# Patient Record
Sex: FEMALE | Race: BLACK OR AFRICAN AMERICAN | Employment: FULL TIME | URBAN - METROPOLITAN AREA
[De-identification: names, ages, dates, MRNs, and addresses within clinical notes are randomized per-mention and may not be internally consistent; named-entity substitution may affect disease eponyms.]

---

## 2018-11-28 ENCOUNTER — DOCUMENTATION ONLY (OUTPATIENT)
Dept: SURGERY | Age: 53
End: 2018-11-28

## 2018-11-28 NOTE — LETTER
[facility] [place] [facility] 
 
 
Dear Patient, Your health is our main concern. It is important for your health to have follow-up lab work and to see you surgeon at 2 months, 4 months, 6 months, 9 months and annually after your weight loss surgery. Additionally, the Department of Bariatric Surgery at our hospital is a member of the [employer] [address] Surgical Quality Improvement Program ([facility] NSQIP). As a participant in this program, we gather information on the outcomes of our patients after surgery. Please call the office for a follow up appointment at [phone]. If you have moved out of the area or have changed surgeons please call us and let us know the name of your doctor. Your health and feedback are important to us. We greatly appreciate your response. Thank you, [facility] [address]

## 2018-12-05 ENCOUNTER — TELEPHONE (OUTPATIENT)
Dept: SURGERY | Age: 53
End: 2018-12-05

## 2019-01-08 ENCOUNTER — OFFICE VISIT (OUTPATIENT)
Dept: SURGERY | Age: 54
End: 2019-01-08

## 2019-01-08 ENCOUNTER — HOSPITAL ENCOUNTER (OUTPATIENT)
Dept: LAB | Age: 54
Discharge: HOME | End: 2019-01-08
Payer: COMMERCIAL

## 2019-01-08 VITALS
OXYGEN SATURATION: 99 % | WEIGHT: 205.3 LBS | TEMPERATURE: 98.7 F | SYSTOLIC BLOOD PRESSURE: 130 MMHG | DIASTOLIC BLOOD PRESSURE: 82 MMHG | BODY MASS INDEX: 41.39 KG/M2 | HEART RATE: 73 BPM | HEIGHT: 59 IN

## 2019-01-08 DIAGNOSIS — Z98.84 S/P BARIATRIC SURGERY: ICD-10-CM

## 2019-01-08 DIAGNOSIS — E55.9 HYPOVITAMINOSIS D: ICD-10-CM

## 2019-01-08 DIAGNOSIS — K90.9 INTESTINAL MALABSORPTION, UNSPECIFIED TYPE: Primary | ICD-10-CM

## 2019-01-08 PROBLEM — E66.01 OBESITY, MORBID (HCC): Status: ACTIVE | Noted: 2019-01-08

## 2019-01-08 PROBLEM — K21.9 GERD (GASTROESOPHAGEAL REFLUX DISEASE): Status: ACTIVE | Noted: 2019-01-08

## 2019-01-08 LAB — 25(OH)D3 SERPL-MCNC: 12.1 NG/ML (ref 30–100)

## 2019-01-08 PROCEDURE — 36415 COLL VENOUS BLD VENIPUNCTURE: CPT

## 2019-01-08 PROCEDURE — 82306 VITAMIN D 25 HYDROXY: CPT

## 2019-01-08 NOTE — PROGRESS NOTES
Subjective:  
  
Angelia rTan is a 48 y.o. female is now 5 years status post laparoscopic sleeve gastrectomy. Doing well overall. She has lost a total of 22 pounds since surgery. Body mass index is 41.47 kg/m². Has lost 20% of EBW. Currently on a solid food diet without difficulty, reports no issues and denies vomiting and abdominal pain. Taking in 30oz water daily. Sources of protein include chicken. She states that bread is her weakness along with fried foods such as french fries and fried chicken. 45 min of activity 3 days a week, including walk, run and going to the gym. She is sleeping 6-7 hours a night on average. Bowel movements are regular. The patient is not having any pain. . The patient is compliant with calcium supplements. Her PCP told her to stop taking her multivitamin, vit B12 and Rx vit D. Weight Loss Metrics 1/8/2019 7/16/2015 1/13/2015 10/2/2014 7/3/2014 4/22/2014 2/20/2014 Today's Wt 205 lb 4.8 oz 173 lb 168 lb 171 lb 171 lb 181 lb 192 lb BMI 41.47 kg/m2 34.92 kg/m2 33.91 kg/m2 34.52 kg/m2 34.52 kg/m2 36.54 kg/m2 38.76 kg/m2 Patient Active Problem List  
Diagnosis Code  Menopause Z78.0  Hypertension I10  
 Intestinal malabsorption K90.9  Unspecified intestinal malabsorption K90.9  Status post bariatric surgery Z98.84  
 Hypovitaminosis D E55.9  Obesity, morbid ([address] E66.01  
 GERD (gastroesophageal reflux disease) K21.9 Past Medical History:  
Diagnosis Date  Hypertension  Hypovitaminosis D  Menopause  Morbid obesity ([address] 4/18/2013  Other ill-defined conditions(799.89) chronic lymph edema  Status post bariatric surgery  Unspecified intestinal malabsorption Past Surgical History:  
Procedure Laterality Date  HX BREAST BIOPSY    
 left  HX LAP CHOLECYSTECTOMY  HX TUBAL LIGATION    
 DEVIN BIOPSY BREAST STEREOTACTIC  2010  
 left (benign)  MO LAP, BLAIRE RESTRICT PROC, LONGITUDINAL GASTRECTOMY Current Outpatient Medications Medication Sig Dispense Refill  hydrochlorothiazide (HYDRODIURIL) 25 mg tablet Take 25 mg by mouth daily.  calcium citrate-vitamin D3 500 mg-500 unit /5 gram pwpk Take  by mouth. No Known Allergies Review of Systems: 
General - No history or complaints of unexpected fever or chills Head/Neck - No history or complaints of headache or dizziness Cardiac - No history or complaints of chest pain, palpitations, or shortness of breath Pulmonary - No history or complaints of shortness of breath or productive cough Gastrointestinal - as noted above Genitourinary - No history or complaints of hematuria/dysuria or renal lithiasis Musculoskeletal - No history or complaints of joint  muscular weakness Hematologic - No history of any bleeding episodes Neurologic - No history or complaints of  migraine headaches or neurologic symptoms Objective:  
 
Visit Vitals /82 (BP 1 Location: Left arm, BP Patient Position: Sitting) Pulse 73 Temp 98.7 °F (37.1 °C) Ht 4' 11\" (1.499 m) Wt 93.1 kg (205 lb 4.8 oz) SpO2 99% BMI 41.47 kg/m² General:  alert, cooperative, no distress, appears stated age Chest: lungs clear to auscultation, breath sounds equal and symmetric, no rhonchi, rales or wheezes, no accessory muscle use Cor:   Regular rate and rhythm or without murmur or extra heart sounds Abdomen: soft, bowel sounds active, non-tender, no masses or organomegaly Incisions:   healed well Assessment:  
History of Morbid obesity, status post laparoscopic sleeve gastrectomy. Doing well postoperatively. Increase fluid intake to >64oz daily. I suspect that she is drinking more than she calculated because she is also taking 25mg of HCTZ and is not exhibiting signs of dehydration.  
Weight regain - strict diet control, pt ed given on the effects of carbohydrate consumption and weight control. Recommend that she keep food log and have RD review it. Lab slip given to assess Vit D level Pt is to resume taking multivitamin and Vit B12. Since her Vit B12 level was so high, I told her she can take Vit b12 every other day if she wants. GERD - continue PPI 
HTN - f/u PCP Pt education given on the effects of sleep deprivation and weight control. Goal is 7-9 hours of sleep each night. Plan:  
 
1. Increase activity to the goal of 30 minutes daily 2. Discussed patients weight loss goals and dietary choices in relation to goals. 3. Reminded to measure portions, continue high protein, low carbohydrate diet. Reminded to eat regularly, to eat slowly & not to drink with meals. 4. Continue vitamin supplementation 5. Continue current medications and follow up with PCP for management of regimen. 6. Continue cardio exercise and add resistance exercises. 60-90 minutes of aerobic activity 5 days a week and strength training 2 days each week. 7. Encouraged to attend support group 8. Patient to complete labs before next visit. Lab slip given today. 9. I have discussed this plan with patient and they verbalized understanding 10. Follow up in 6 months or sooner if patient has questions, concerns or worsening of condition, if unable to reach our office, patient should report to the ED. 6. Ms. Rosemary Valdes has a reminder for a \"due or due soon\" health maintenance. I have asked that she contact her primary care provider for a follow-up on this health maintenance.

## 2019-01-08 NOTE — PATIENT INSTRUCTIONS
Patient Instructions 1. Remember hydration goals - minimum of 64 ounces of liquids per day (dehydration is the number one reason for hospital readmission). 2. Continue to monitor carbohydrate and protein intake you need a minimum of  Grams of protein daily- remember to keep your total carbohydrates to 50 grams or less per day for best results. 3. Continue to work towards exercise goals - 60-90 minutes, 5 times a week minimum of deliberate, aerobic exercise is the ultimate goal with strength training 2 times each week. Refer to [employer] for  information. 4. Remember to take vitamins as directed in your handbook. 5. Attend support group the 2nd Thursday of each month. 6. Constipation: Milk of Magnesia is for immediate relief. Miralax is to be used every day if constipation is a chronic problem. 7. Diarrhea: patients will occasionally develop lactose intolerance after surgery. Check to see if your protein shake has whey in it. If it does try one that does not have whey and stop all yogurts, cheeses and milks to see if the diarrhea goes away. 8. Call us at [phone] or email us through [facility]" with questions,     concerns or worsening of condition, we have someone on call 24 hours a day. If you are unable to reach our office, you are to go to your Primary Care Physician or the Emergency Department. Supplement Resource Guide Importance of Protein:  
Maintains lean body mass, produces antibodies to fight off infections, heals wounds, minimizes hair loss, helps to give you energy, helps with satiety, and keeping you full between meals. Importance of Calcium: 
Needed for healthy bones and teeth, normal blood clotting, and nervous system functioning, higher risk of osteoporosis and bone disease with non-compliance. Importance of Multivitamins: Many functions. Supply you with extra nutrients that you may be missing from food. May lead to iron deficiency anemia, weakness, fatigue, and many other symptoms with non-compliance. Importance of B Vitamins: 
Important for red blood cell formation, metabolism, energy, and helps to maintain a healthy nervous system. Protein Supplement Find one you like now. Use immediately after surgery. Look for: 
35-50g protein each day from your protein supplement once you reach the progression diet. 0-3 g fat per serving 0-3 g sugar per serving Protein drinks should be split in separate dosages. Recommend: Lifelong 1 year +Calcium Supplement:  
 
Start taking within a month after surgery. Look for: Calcium Citrate Plus D (1500 mg per day) Recommend: Citracal 
 
 . Avoid chocolate chewable calcium. Can use chewable bariatric or [employer] brand or similar chewable. The body cannot absorb more than 500-600 mg of calcium at a time. Take for Life Multi-vitamin Supplement:   
Start immediately after surgery: any complete chewable, such as: [place]s Complete chewables. Avoid [place] sours or gummies. They lack iron and other important nutrients and also have added sugar. Continue with chewable vitamin or change to adult complete multivitamin one month after surgery. Menstruating women can take a prenatal vitamin. Make sure has at least 18 mg iron and 627-230 mcg folic acid Vitamin B12, B Complex Vitamin, and Biotin Start taking within a month after surgery. Vitamin B12:  1000 mcg of Vitamin B12 three times weekly Must take sublingually (meaning you take it under your tongue) or in a liquid drop form for easy absorption. B Complex Vitamin: Take a pill or liquid drop form once daily. Biotin: This vitamin can help prevent hair loss. Recommend 5mg  
(5000 mcg) a day Biotin is Optional

## 2019-01-09 ENCOUNTER — TELEPHONE (OUTPATIENT)
Dept: SURGERY | Age: 54
End: 2019-01-09

## 2019-01-09 NOTE — TELEPHONE ENCOUNTER
I left a message for patient on her cell phone and home phone voice mail. I asked if she would call me back. Her Vit D lab was low at 12.6 which is significantly low. She already has Rx Vit D pills at home. I wanted to find out how many pills she already has and send in a new prescription for her and I was going to increase her dose to two times a week.

## 2019-01-10 ENCOUNTER — TELEPHONE (OUTPATIENT)
Dept: SURGERY | Age: 54
End: 2019-01-10

## 2019-01-11 ENCOUNTER — TELEPHONE (OUTPATIENT)
Dept: SURGERY | Age: 54
End: 2019-01-11

## 2019-01-11 DIAGNOSIS — E55.9 HYPOVITAMINOSIS D: Primary | ICD-10-CM

## 2019-01-11 RX ORDER — ERGOCALCIFEROL 1.25 MG/1
50000 CAPSULE ORAL 2 TIMES WEEKLY
Qty: 52 CAP | Refills: 1 | Status: SHIPPED | OUTPATIENT
Start: 2019-01-11 | End: 2019-07-09

## 2019-01-11 NOTE — TELEPHONE ENCOUNTER
I called patient to let her know that her Vit D level is low at 12.6. I sent Rx Vit D to her pharmacy. Her PCP told her to stop taking her multivitamin, Vit B12 and Vit D. I told her that she needs to take the multivitamin, B12 and Rx D. I told her that if she wants to take Vit B12 every other day she can because her level was over 2,000. Patient verbalized understanding. Patient is to contact our office with any problems, worsening of condition, questions or concerns. If we are not available or unable to be reached, patient is to proceed to the Emergency Department.

## 2020-11-13 ENCOUNTER — DOCUMENTATION ONLY (OUTPATIENT)
Dept: SURGERY | Age: 55
End: 2020-11-13

## 2020-11-13 NOTE — PROGRESS NOTES
Per [facility] requirements;  E-mail and letter sent for follow up appointment. [employer] [facility]  [address]   [address], [address] [facility]                 [employer] [facility] [place]  [facility]  [facility]      Dear Patient,    Your health is our main concern. It is important for your health to have follow-up lab work and to see your surgeon at 2 months, 4 months, 6 months, 9 months and annually after your weight loss surgery. Additionally, the Department of Bariatric Surgery at our hospital is a member of the [employer] of [address] [place] [address] Surgical Quality Improvement Program ([facility] NSQIP). As a participant in this program, we gather information on the outcomes of our patients after surgery. Please call the office for a follow up appointment at [phone]. If you have moved out of the area or have changed surgeons please call us and let us know the name of your doctor. Your health and feedback are important to us. We greatly appreciate your response.        Thank you,  [facility] [place] [facility] [address] [facility]

## 2022-03-19 PROBLEM — E66.01 OBESITY, MORBID (HCC): Status: ACTIVE | Noted: 2019-01-08

## 2022-03-19 PROBLEM — K21.9 GERD (GASTROESOPHAGEAL REFLUX DISEASE): Status: ACTIVE | Noted: 2019-01-08

## 2022-09-20 ENCOUNTER — OFFICE VISIT (OUTPATIENT)
Dept: SURGERY | Age: 57
End: 2022-09-20
Payer: COMMERCIAL

## 2022-09-20 ENCOUNTER — HOSPITAL ENCOUNTER (OUTPATIENT)
Dept: LAB | Age: 57
Discharge: HOME | End: 2022-09-20
Payer: COMMERCIAL

## 2022-09-20 VITALS
TEMPERATURE: 97.3 F | HEIGHT: 59 IN | DIASTOLIC BLOOD PRESSURE: 62 MMHG | OXYGEN SATURATION: 100 % | HEART RATE: 76 BPM | SYSTOLIC BLOOD PRESSURE: 122 MMHG | WEIGHT: 201.3 LBS | BODY MASS INDEX: 40.58 KG/M2

## 2022-09-20 DIAGNOSIS — K90.9 INTESTINAL MALABSORPTION, UNSPECIFIED TYPE: Primary | ICD-10-CM

## 2022-09-20 DIAGNOSIS — Z98.84 STATUS POST BARIATRIC SURGERY: ICD-10-CM

## 2022-09-20 DIAGNOSIS — E55.9 HYPOVITAMINOSIS D: ICD-10-CM

## 2022-09-20 DIAGNOSIS — L30.4 INTERTRIGINOUS DERMATITIS ASSOCIATED WITH MOISTURE: ICD-10-CM

## 2022-09-20 DIAGNOSIS — K90.9 INTESTINAL MALABSORPTION, UNSPECIFIED TYPE: ICD-10-CM

## 2022-09-20 LAB
25(OH)D3 SERPL-MCNC: 30.2 NG/ML (ref 30–100)
ALBUMIN SERPL-MCNC: 3.5 G/DL (ref 3.4–5)
ALBUMIN/GLOB SERPL: 1 {RATIO} (ref 0.8–1.7)
ALP SERPL-CCNC: 110 U/L (ref 45–117)
ALT SERPL-CCNC: 16 U/L (ref 13–56)
ANION GAP SERPL CALC-SCNC: 2 MMOL/L (ref 3–18)
AST SERPL-CCNC: 14 U/L (ref 10–38)
BASOPHILS # BLD: 0.1 K/UL (ref 0–0.1)
BASOPHILS NFR BLD: 1 % (ref 0–2)
BILIRUB SERPL-MCNC: 0.2 MG/DL (ref 0.2–1)
BUN SERPL-MCNC: 16 MG/DL (ref 7–18)
BUN/CREAT SERPL: 19 (ref 12–20)
CALCIUM SERPL-MCNC: 9.2 MG/DL (ref 8.5–10.1)
CHLORIDE SERPL-SCNC: 104 MMOL/L (ref 100–111)
CO2 SERPL-SCNC: 36 MMOL/L (ref 21–32)
CREAT SERPL-MCNC: 0.85 MG/DL (ref 0.6–1.3)
DIFFERENTIAL METHOD BLD: NORMAL
EOSINOPHIL # BLD: 0.2 K/UL (ref 0–0.4)
EOSINOPHIL NFR BLD: 3 % (ref 0–5)
ERYTHROCYTE [DISTWIDTH] IN BLOOD BY AUTOMATED COUNT: 12.8 % (ref 11.6–14.5)
FERRITIN SERPL-MCNC: 37 NG/ML (ref 8–388)
FOLATE SERPL-MCNC: 9.1 NG/ML (ref 3.1–17.5)
GLOBULIN SER CALC-MCNC: 3.4 G/DL (ref 2–4)
GLUCOSE SERPL-MCNC: 83 MG/DL (ref 74–99)
HCT VFR BLD AUTO: 38.7 % (ref 35–45)
HGB BLD-MCNC: 12.5 G/DL (ref 12–16)
IMM GRANULOCYTES # BLD AUTO: 0 K/UL (ref 0–0.04)
IMM GRANULOCYTES NFR BLD AUTO: 0 % (ref 0–0.5)
IRON SERPL-MCNC: 33 UG/DL (ref 50–175)
LYMPHOCYTES # BLD: 2.4 K/UL (ref 0.9–3.6)
LYMPHOCYTES NFR BLD: 33 % (ref 21–52)
MCH RBC QN AUTO: 25.6 PG (ref 24–34)
MCHC RBC AUTO-ENTMCNC: 32.3 G/DL (ref 31–37)
MCV RBC AUTO: 79.1 FL (ref 78–100)
MONOCYTES # BLD: 0.5 K/UL (ref 0.05–1.2)
MONOCYTES NFR BLD: 7 % (ref 3–10)
NEUTS SEG # BLD: 4.1 K/UL (ref 1.8–8)
NEUTS SEG NFR BLD: 56 % (ref 40–73)
NRBC # BLD: 0 K/UL (ref 0–0.01)
NRBC BLD-RTO: 0 PER 100 WBC
PLATELET # BLD AUTO: 362 K/UL (ref 135–420)
PMV BLD AUTO: 9.7 FL (ref 9.2–11.8)
POTASSIUM SERPL-SCNC: 2.9 MMOL/L (ref 3.5–5.5)
PROT SERPL-MCNC: 6.9 G/DL (ref 6.4–8.2)
RBC # BLD AUTO: 4.89 M/UL (ref 4.2–5.3)
SODIUM SERPL-SCNC: 142 MMOL/L (ref 136–145)
VIT B12 SERPL-MCNC: >2000 PG/ML (ref 211–911)
WBC # BLD AUTO: 7.3 K/UL (ref 4.6–13.2)

## 2022-09-20 PROCEDURE — 80053 COMPREHEN METABOLIC PANEL: CPT

## 2022-09-20 PROCEDURE — 99204 OFFICE O/P NEW MOD 45 MIN: CPT | Performed by: NURSE PRACTITIONER

## 2022-09-20 PROCEDURE — 82607 VITAMIN B-12: CPT

## 2022-09-20 PROCEDURE — 82728 ASSAY OF FERRITIN: CPT

## 2022-09-20 PROCEDURE — 82306 VITAMIN D 25 HYDROXY: CPT

## 2022-09-20 PROCEDURE — 85025 COMPLETE CBC W/AUTO DIFF WBC: CPT

## 2022-09-20 PROCEDURE — 84425 ASSAY OF VITAMIN B-1: CPT

## 2022-09-20 PROCEDURE — 36415 COLL VENOUS BLD VENIPUNCTURE: CPT

## 2022-09-20 PROCEDURE — 83540 ASSAY OF IRON: CPT

## 2022-09-20 RX ORDER — B-COMPLEX WITH VITAMIN C
1 TABLET ORAL DAILY
COMMUNITY

## 2022-09-20 RX ORDER — NYSTATIN 100000 U/G
CREAM TOPICAL 2 TIMES DAILY
Qty: 30 G | Refills: 2 | Status: SHIPPED | OUTPATIENT
Start: 2022-09-20

## 2022-09-20 RX ORDER — BISMUTH SUBSALICYLATE 262 MG
1 TABLET,CHEWABLE ORAL DAILY
COMMUNITY

## 2022-09-20 RX ORDER — MELATONIN
DAILY
COMMUNITY

## 2022-09-20 RX ORDER — MAGNESIUM 200 MG
1000 TABLET ORAL DAILY
COMMUNITY

## 2022-09-20 NOTE — PATIENT INSTRUCTIONS
[employer] or My Fitness Pal Daryl  80-90g protein  800-1000 calories   Keep carbs below 50g                Dr Charissa Kocher Dr Joslyn Quan  [facility]                                                     Patient Instructions      Remember hydration goals - minimum of 64 ounces of liquids per day (dehydration is the number one reason for hospital readmission). Continue to monitor carbohydrate and protein intake you need a minimum of  Grams of protein daily- remember to keep your total carbohydrates to 50 grams or less per day for best results. Continue to work towards exercise goals - 60-90 minutes, 5 times a week minimum of deliberate, aerobic exercise is the ultimate goal with strength training 2 times each week. Refer to [employer] for  information. Remember to take vitamins as directed. Attend support group the 2nd Thursday of each month. 6.  Constipation: Milk of Magnesia is for immediate relief only. Miralax is to be used every day if constipation is a chronic problem. 7.  Diarrhea: patients will occasionally develop lactose intolerance after surgery. Check to see if your protein shake has whey in it. If it does try a protein powder or drink that does not have whey and stop all yogurts, cheeses and milks to see if the diarrhea goes away. 8.  If you have had labs drawn. We will only call you if you have abnormal results. Otherwise you can access the lab results in \"[employer]\". You will only need the access code the first time you sign on. 9.  Call us at [phone] or email us through [facility]" with questions,     concerns or worsening of condition, we have someone on call 24 hours a day. If you are unable to reach our office, you are to go to your Primary Care Physician or the Emergency Department.      NOTE TO GASTRIC BYPASS PATIENTS:  (SAME APPLIES TO GASTRIC SLEEVE PATIENTS FOR FIRST TWO MONTHS)  Remember that for the rest of your life, you are not able to take the following:  - NSAIDs (ibuprofen, goody powder, BC powder, Motrin, Advil, Mobic, Voltaren, Excedrin, etc.)  - Steroid pills or injections  - Smoke (cigarettes or recreational drugs)  - Alcohol  Use of any of the above may cause ulcers in your stomach which may perforate causing a medical emergency and surgery. Speak to our medical staff if another medical provider requires you to take steroids or NSAIDs. Supplement Resource Guide    Importance of Protein:   Maintains lean body mass, produces antibodies to fight off infections, heals wounds, minimizes hair loss, helps to give you energy, helps with satiety, and keeping you full between meals. Importance of Calcium:  Needed for healthy bones and teeth, normal blood clotting, and nervous system functioning, higher risk of osteoporosis and bone disease with non-compliance. Importance of Multivitamins: Many functions. Supply you with extra nutrients that you may be missing from food. May lead to iron deficiency anemia, weakness, fatigue, and many other symptoms with non-compliance. Importance of B Vitamins:  Important for red blood cell formation, metabolism, energy, and helps to maintain a healthy nervous system. Protein Supplement  Find one you like now. Use immediately after surgery. Look for:  35-50g protein each day from your protein supplement once you reach the progression diet. 0-3 g fat per serving  0-3 g sugar per serving    Protein drinks should be split in separate dosages. Recommend: Lifelong  1 year + Calcium Supplement:     Start taking within a month after surgery. Look for: Calcium Citrate Plus D (1500 mg per day)  Recommend: Citracal     .            Avoid chocolate chewable calcium. Can use chewable bariatric or [employer] brand or similar chewable. The body cannot absorb more than 500-600 mg of calcium at a time.       Take for Life Multi-vitamin Supplement:      Start immediately after surgery: any complete chewable, such as: [place]s Complete chewables. Avoid [place] sours or gummies. They lack iron and other important nutrients and also have added sugar. Continue with chewable vitamin or change to adult complete multivitamin one month after surgery. Menstruating women can take a prenatal vitamin. Make sure has at least 18 mg iron and 691-660 mcg folic acid   Vitamin I57, B Complex Vitamin, and Biotin  Start taking within a month after surgery. Vitamin B12:  1000 mcg of Vitamin B12 three times weekly    Must take sublingually (meaning you take it under your tongue) or in a liquid drop form for easy absorption. B Complex Vitamin: Take a pill or liquid drop form once daily. Biotin: This vitamin can help prevent hair loss. Recommend 5mg   (5000 mcg) a day  Biotin is Optional           Learning About Being Physically Active  What is physical activity? Being physically active means doing any kind of activity that gets your body moving. The types of physical activity that can help you get fit and stay healthy include:  Aerobic or \"cardio\" activities. These make your heart beat faster and make you breathe harder, such as brisk walking, riding a bike, or running. They strengthen your heart and lungs and build up your endurance. Strength training activities. These make your muscles work against, or \"resist,\" something. Examples include lifting weights or doing push-ups. These activities help tone and strengthen your muscles and bones. Stretches. These let you move your joints and muscles through their full range of motion. Stretching helps you be more flexible. What are the benefits of being active? Being active is one of the best things you can do for your health. It helps you to:  Feel stronger and have more energy to do all the things you like to do. Focus better at school or work.   Feel, think, and sleep better. Reach and stay at a healthy weight. Lose fat and build lean muscle. Lower your risk for serious health problems, including diabetes, heart attack, high blood pressure, and some cancers. Keep your heart, lungs, bones, muscles, and joints strong and healthy. How can you make being active part of your life? Start slowly. Make it your long-term goal to get at least 30 minutes of exercise on most days of the week. Walking is a good choice. You also may want to do other activities, such as running, swimming, cycling, or playing tennis or team sports. Pick activities that you like--ones that make your heart beat faster, your muscles stronger, and your muscles and joints more flexible. If you find more than one thing you like doing, do them all. You don't have to do the same thing every day. Get your heart pumping every day. Any activity that makes your heart beat faster and keeps it at that rate for a while counts. Here are some great ways to get your heart beating faster:  Go for a brisk walk, run, or bike ride. Go for a hike or swim. Go in-line skating. Play a game of touch football, basketball, or soccer. Ride a bike. Play tennis or racquetball. Climb stairs. Even some household chores can be aerobic--just do them at a faster pace. Vacuuming, raking or mowing the lawn, sweeping the garage, and washing and waxing the car all can help get your heart rate up. Strengthen your muscles during the week. You don't have to lift heavy weights or grow big, bulky muscles to get stronger. Doing a few simple activities that make your muscles work against, or \"resist,\" something can help you get stronger. For example, you can:  Do push-ups or sit-ups, which use your own body weight as resistance. Lift weights or dumbbells or use stretch bands at home or in a gym or community center. Stretch your muscles often. Stretching will help you as you become more active.  It can help you stay flexible, loosen tight muscles, and avoid injury. It can also help improve your balance and posture and can be a great way to relax. Be sure to stretch the muscles you'll be using when you work out. It's best to warm your muscles slightly before you stretch them. Walk or do some other light aerobic activity for a few minutes, and then start stretching. When you stretch your muscles:  Do it slowly. Stretching is not about going fast or making sudden movements. Don't push or bounce during a stretch. Hold each stretch for at least 15 to 30 seconds, if you can. You should feel a stretch in the muscle, but not pain. Breathe out as you do the stretch. Then breathe in as you hold the stretch. Don't hold your breath. If you're worried about how more activity might affect your health, have a checkup before you start. Follow any special advice your doctor gives you for getting a smart start. Where can you learn more? Go to http://www.gray.com/  Enter [id number] in the search box to learn more about \"Learning About Being Physically Active. \"  Current as of: May 12, 2021               Content Version: 13.2  © 9545-2018 [employer], [employer]. Care instructions adapted under license by [employer] (which disclaims liability or warranty for this information). If you have questions about a medical condition or this instruction, always ask your healthcare professional. [address] any warranty or liability for your use of this information.

## 2022-09-20 NOTE — PROGRESS NOTES
Subjective:     Natividad Rene  is a 62 y.o. female who presents for follow-up about 8.75 years following laparoscopic sleeve gastrectomy. She has lost a total of 26 pounds since surgery. Body mass index is 40.66 kg/m². . EBWL is (24%). The patient presents today to assess their progress toward their goal of weight loss and to address any issues that may be present. Today the patient and I have reviewed their diet and how appropriate their food choices are. The following issues have been identified - none from . Last seen at 5 years postop 1/8/2019 when she had lost 22 lbs (20% EBWL). States lowest weight following surgery has been 168 lbs. Surgery related complication: NA       She reports hunger and carb cravings and denies vomiting, abdominal pain, difficulty swallowing, nausea and reflux. Also, reports skin irritation and rash under pannus and interested in pursuing excess skin removal.    The patients diet choices have been reviewed today and counseling was given. Fluid intake:  good      Protein intake: occ protein shake; chicken, nuts      Meals/day: usually 2, rarely 3  Increased sweets, eats oatmeal for breakfast, does have appt with dietitian scheduled    Patients pain score:0/10    The patient's exercise level: Exercises 3 days a week for 60 minutes. Changes in her medical history and medications have been reviewed. Last vitamin D level 12 in 2019.     Comorbidities:    Hypertension: improved, on Rx  Diabetes: not applicable  Obstructive Sleep Apnea: not applicable  Hyperlipidemia: not applicable  Stress Urinary Incontinence: not applicable  Gastroesophageal Reflux: not applicable  Weight related arthropathy:not applicable    Patient Active Problem List   Diagnosis Code    Menopause Z78.0    Hypertension I10    Intestinal malabsorption K90.9    Unspecified intestinal malabsorption K90.9    Status post bariatric surgery Z98.84    Hypovitaminosis D E55.9    Obesity, morbid ([address] E66.01    GERD (gastroesophageal reflux disease) K21.9     Past Medical History:   Diagnosis Date    Hypertension     Hypovitaminosis D     Menopause     Morbid obesity ([facility] [address] 4/18/2013    Other ill-defined conditions(799.89)     chronic lymph edema    Status post bariatric surgery     Unspecified intestinal malabsorption      Past Surgical History:   Procedure Laterality Date    HX BREAST BIOPSY      left    HX LAP CHOLECYSTECTOMY      HX TUBAL LIGATION      DEVIN BIOPSY BREAST STEREOTACTIC  2010    left (benign)    NM LAP, BLAIRE RESTRICT PROC, LONGITUDINAL GASTRECTOMY       Current Outpatient Medications   Medication Sig Dispense Refill    cyanocobalamin (VITAMIN B-12) 1,000 mcg sublingual tablet Take 1,000 mcg by mouth daily. cholecalciferol (Vitamin D3) (1000 Units /25 mcg) tablet Take  by mouth daily. b-complex with vitamin c tablet Take 1 Tablet by mouth daily. multivitamin (ONE A DAY) tablet Take 1 Tablet by mouth daily. nystatin (MYCOSTATIN) topical cream Apply  to affected area two (2) times a day. 30 g 2    hydrochlorothiazide (HYDRODIURIL) 25 mg tablet Take 25 mg by mouth daily. calcium citrate-vitamin D3 500 mg-500 unit /5 gram pwpk Take  by mouth.           Review of Symptoms:       General - No history or complaints of unexpected fever or chills  Head/Neck - No history or complaints of headache or dizziness  Cardiac - No history or complaints of chest pain, palpitations, or shortness of breath  Pulmonary - No history or complaints of shortness of breath or productive cough  Gastrointestinal - as noted above  Genitourinary - No history or complaints of hematuria/dysuria or renal lithiasis  Musculoskeletal - No history or complaints of joint  muscular weakness  Hematologic - No history of any bleeding episodes  Neurologic - No history or complaints of  migraine headaches or neurologic symptoms                     Objective:     Visit Vitals  /62 (BP 1 Location: Left upper arm, BP Patient Position: Sitting, BP Cuff Size: Large adult)   Pulse 76   Temp 97.3 °F (36.3 °C)   Ht 4' 11\" (1.499 m)   Wt 91.3 kg (201 lb 4.8 oz)   SpO2 100%   BMI 40.66 kg/m²        Physical Exam:      General appearance:  alert, cooperative, no distress, appears stated age   Mental status   alert, oriented to person, place, and time   Neck  supple, no significant adenopathy     Lymphatics  no palpable lymphadenopathy, no hepatosplenomegaly   Chest  clear to auscultation, no wheezes, rales or rhonchi, symmetric air entry   Heart  normal rate, regular rhythm, normal S1, S2, no murmurs, rubs, clicks or gallops    Abdomen: soft, nontender, nondistended, no masses or organomegaly   Incision:  Well healed, no hernias      Neurological  alert, oriented, normal speech, no focal findings or movement disorder noted   Musculoskeletal no joint tenderness, deformity or swelling   Extremities peripheral pulses normal, no pedal edema, no clubbing or cyanosis   Skin normal coloration and turgor, no rashes, no suspicious skin lesions noted          Lab Results   Component Value Date/Time    WBC 7.3 09/20/2022 03:43 PM    HGB 12.5 09/20/2022 03:43 PM    HCT 38.7 09/20/2022 03:43 PM    PLATELET 317 92/02/2616 03:43 PM    MCV 79.1 09/20/2022 03:43 PM     Lab Results   Component Value Date/Time    Sodium 144 07/14/2015 03:10 PM    Potassium 4.0 07/14/2015 03:10 PM    Chloride 103 07/14/2015 03:10 PM    CO2 24 07/14/2015 03:10 PM    Anion gap 8 12/09/2013 11:41 AM    Glucose 79 07/14/2015 03:10 PM    BUN 20 07/14/2015 03:10 PM    Creatinine 0.81 07/14/2015 03:10 PM    BUN/Creatinine ratio 25 (H) 07/14/2015 03:10 PM    GFR est AA 98 07/14/2015 03:10 PM    GFR est non-AA 85 07/14/2015 03:10 PM    Calcium 9.4 07/14/2015 03:10 PM    Bilirubin, total 0.3 07/14/2015 03:10 PM    Alk.  phosphatase 93 07/14/2015 03:10 PM    Protein, total 6.6 07/14/2015 03:10 PM    Albumin 4.3 07/14/2015 03:10 PM    Globulin 4.3 (H) 12/09/2013 11:41 AM    A-G Ratio 1.9 07/14/2015 03:10 PM    ALT (SGPT) 13 07/14/2015 03:10 PM     Lab Results   Component Value Date/Time    Iron 62 07/14/2015 03:10 PM    Ferritin 146 07/14/2015 03:10 PM     Lab Results   Component Value Date/Time    Folate >19.9 07/14/2015 03:10 PM     Lab Results   Component Value Date/Time    Vitamin D 25-Hydroxy 12.1 (L) 01/08/2019 02:46 PM         No results for input(s): VITD3 in the last 66529 hours. Assessment and Plan:   Intestinal malabsorption  continue required Vitamins: B12, B complex, D, iron, calcium, multivitamin  S/p laparoscopic bariatric surgery, sleeve gastrectomy, history of morbid obesity. Reviewed weight loss progress and has had regain due to decreased protein and increased carbs. Do recommend using food tracking joel to ensure adequate protein and caloric intake. Aim for 80-90 g protein daily and 800-1000 calories. Decrease daily carbs below 50g. Continue to increase exercise. Keep scheduled dietitian appt for additional reinforcement, multiple handouts given today  Sleep goal is 7-9 hours each night. Patient education given on the effects of sleep deprivation on weight control. Discussed patients weight loss goals and dietary choices in relation to goals. Reminded to measure portions, continue high protein, low carbohydrate diet. Reminded to eat regularly, to eat slowly & not to drink with meals. Continue cardio exercise and add resistance exercises. 60-90 minutes of aerobic activity 5 days a week and strength training 2 days each week. Encouraged to attend support group   Required fluid intake is >64oz daily of decaffeinated sugar free beverages. 3. Hypovitaminosis D - using D3, repeat level  4. Intertriginous dermatitis - nystatin Rx sent, avoid moisture, name of local plastic surgeon given  Labs ordered today  Follow up in 6 months or sooner if patient has questions, concerns or worsening of condition, if unable to reach our office, patient should report to the ED.   Ms. Rahul Collins has a reminder for a \"due or due soon\" health maintenance. I have asked that she contact her primary care provider for a follow-up on this health maintenance. Total time spent with the patient 45 minutes.

## 2022-09-21 ENCOUNTER — DOCUMENTATION ONLY (OUTPATIENT)
Dept: SURGERY | Age: 57
End: 2022-09-21

## 2022-09-21 NOTE — PROGRESS NOTES
Spoke with patient about potassium level of 2.9. Is on diurectic and has been having issues with hypokalemia that she states her PCP Dr Shima Wright has been addressing will supplementation. She will call his office to notify.

## 2022-09-23 LAB — VIT B1 BLD-SCNC: 139.9 NMOL/L (ref 66.5–200)

## 2022-10-10 DIAGNOSIS — E55.9 HYPOVITAMINOSIS D: Primary | ICD-10-CM

## 2022-10-10 RX ORDER — ERGOCALCIFEROL 1.25 MG/1
50000 CAPSULE ORAL
Qty: 52 CAPSULE | Refills: 0 | Status: SHIPPED | OUTPATIENT
Start: 2022-10-10

## 2022-10-13 ENCOUNTER — TELEPHONE (OUTPATIENT)
Dept: SURGERY | Age: 57
End: 2022-10-13

## 2022-10-13 NOTE — TELEPHONE ENCOUNTER
Phone call with pt and on Tuesday she started her high dose vitamin D 50,000 iu, two hours later her vision became blurry and on Wednesday her arms and left leg became itchy. Per Emaline Mess hold vitamin d now and see PCP and pt expresses understanding.

## 2022-10-24 ENCOUNTER — HOSPITAL ENCOUNTER (OUTPATIENT)
Dept: BARIATRICS/WEIGHT MGMT | Age: 57
Discharge: HOME | End: 2022-10-24

## 2022-10-24 VITALS — BODY MASS INDEX: 41.15 KG/M2 | WEIGHT: 204.1 LBS | HEIGHT: 59 IN

## 2022-10-24 NOTE — PROGRESS NOTES
Patient's Name: Chhaya Carvajal   Age: 62 y.o. YOB: 1965   Sex: female    Date:   10/24/2022    Visit Vitals  Ht 4' 11\" (1.499 m)   Wt 92.6 kg (204 lb 1.6 oz)   BMI 41.22 kg/m²       Surgery Date: 12/19/2013    Starting Weight: 227   EBW: 104 lbs  Overall Pounds Lost: 23 lbs  22 % EBWL       Procedure: laparoscopic sleeve gastrectomy procedure        Lowest Weight patient has achieved since surgery: 163 lbs    How long has patient been at this weight for: 6-8 yrs    Other Pertinent Information: Pt reports that although her weight has fluctuated since 2014, she has remained a size 14 in clothes. [] Smoking  Type/Frequency: N/A    [] Alcohol Intake   0 drinks/d   0 times per week. Per pt dietary recall, pt eats fried foods, fruit TID, Protein BID, non-starchy vegetables, mostly. Note that pt consumes a high intake of calories from simple sugars. Reviewed nutrition labels of foods routinely consumed as well as fast food options that pt selects and discussed with pt the importance of selecting food options which meet the 3 g rule for fat and sugar. Daily dietary intake restrictions recommended:  approx.  50 g or less carbohydrate/day,  g protein per day, 25 g fat, 800-1000 kcal.      [x] Simple sugar intake  Type: cookies, cake, ice cream    [x] Experiencing dumping syndrome  [] Carbohydrate intake: bread,     Symptoms that occur when carbohydrates are consumed: diarrhea, stomach pain    Ounces of fluid consumed per day: 64 oz total/day    Fluids being consumed: 48 oz water, 16.9 oz Diet Pepsi/Diet Ginger Ale,    Patient is not drinking protein drinks, but makes herself smoothies 1-2 x/wk/wk (spinach, banana, strawberry and zora, with water), reviewed options for pt to utilize to create a high protein low carbohydrate beverage such as adding calorie free water additives to whey protein powder with spinach and 1/2 C or less berries, ice cubes, and sf pudding powder to make protein \"smoothie\" without all the carbs from a high fruit diet. Patient is snacking on: cookies ([place] Karen fingers, vanilla wafers), popcorn, crackers & peanut butter. Reviewed with pt snack selection and looking for sugar-free, low-fat & high in lean protein snacks. Exercise History    Patient is doing the elliptical machine/treadmill/stationary bike for exercise. 45+ min/day, 3 days/wk. Vitamins    [x] Multivitamin with iron [x] BID (PNV)  [] Probiotic  [] Calcium citrate: 1,500 mg/d, taken in doses of 500 mg TID, 2 hours apart from previous calcium citrate doses and MVI doses Pt states that she stopped this approx. 1 year. Discussed with pt the importance of vitamin & mineral supplementation and reviewed dosage guidelines, encouraging pt to start taking her calcium again. [x] Vitamin B-complex: follow directions on supplement, 1-2 capsules daily. [x]Vitamin B12: 5,000 mcg daily, taken sublingually   [x]Vitamin D3: 50,000 IU per week    Meals per day: 2-3 meals/day, pt states that when she doesn't have an appetite she does not eat, or will only drink a diet soda. Summary: Weight loss is at 23 pounds. I have reinforced the key diet principles to the patient. Patient was instructed to follow a 3 meal per day routine. I have reinforced the importance of filling up on meat and vegetables and avoiding carbohydrates. I have talked with patient about reactive hypoglycemia and although carbohydrates are not good from a weight-management point of view, they are actually very dangerous and should be avoided. If patient is getting hungry between meals focus on meat and vegetables and a list of food choices was reviewed with patient. Reinforced to patient the importance of being properly hydrated and the need to get 64 ounces of fluid in per day. Reinforced to patient the need to do 30 minutes of exercise per day, aiming for 150-300 min/wk of cardiovascular physical activity minimum. .      We also spent some time talking about the vitamins and the importance of taking them. Reinforced the dosage of vitamins to patient. Patient was reminded that the vitamins are lifelong. Other Pertinent Information: Pt scheduled for follow-up nutrition appt on 2/22/23. All current nutrition-related questions answered, pt encouraged to call with any future nutrition-related questions or concerns.       Virginia Tran MS RD/LD  10/24/2022

## 2024-05-29 ENCOUNTER — OFFICE VISIT (OUTPATIENT)
Age: 59
End: 2024-05-29

## 2024-05-29 VITALS
BODY MASS INDEX: 40.62 KG/M2 | DIASTOLIC BLOOD PRESSURE: 61 MMHG | OXYGEN SATURATION: 100 % | SYSTOLIC BLOOD PRESSURE: 118 MMHG | HEART RATE: 77 BPM | HEIGHT: 59 IN | WEIGHT: 201.5 LBS | TEMPERATURE: 98.6 F

## 2024-05-29 DIAGNOSIS — K90.9 INTESTINAL MALABSORPTION, UNSPECIFIED TYPE: Primary | ICD-10-CM

## 2024-05-29 DIAGNOSIS — L30.4 INTERTRIGINOUS DERMATITIS ASSOCIATED WITH MOISTURE: ICD-10-CM

## 2024-05-29 DIAGNOSIS — Z98.84 S/P LAPAROSCOPIC SLEEVE GASTRECTOMY: ICD-10-CM

## 2024-05-29 RX ORDER — NYSTATIN 100000 U/G
CREAM TOPICAL 2 TIMES DAILY
Qty: 30 G | Refills: 2 | Status: SHIPPED | OUTPATIENT
Start: 2024-05-29

## 2024-05-29 NOTE — PATIENT INSTRUCTIONS
[employer]itastic or My Fitness Pal Bre  80-90g protein  800-1000 calories   Keep carbs below 50g      Patient Instructions      Remember hydration goals - minimum of 64 ounces of liquids per day (dehydration is the number one reason for hospital readmission).  Continue to monitor carbohydrate and protein intake you need a minimum of  Grams of protein daily- remember to keep your total carbohydrates to 50 grams or less per day for best results.  Continue to work towards exercise goals - 60-90 minutes, 5 times a week minimum of deliberate, aerobic exercise is the ultimate goal with strength training 2 times each week. Refer to [email or web address] for  information.  Remember to take vitamins as directed.  Attend support group the 2nd Thursday of each month.  6.  Constipation: Milk of Magnesia is for immediate relief only.  Miralax is to be used every day if constipation is a chronic problem.    7.  Diarrhea: patients will occasionally develop lactose intolerance after surgery.  Check to see if your protein shake has whey in it.  If it does try a protein powder or drink that does not have whey and stop all yogurts, cheeses and milks to see if the diarrhea goes away.    8.  If you have had labs drawn.  We will only call you if you have abnormal results.  Otherwise you can access the lab results in \"[facility]\".  You will only need the access code the first time you sign on.    9.  Call us at [phone] or email us through \"[employer]\" with questions,     concerns or worsening of condition, we have someone on call 24 hours a day.  If you are unable to reach our office, you are to go to your Primary Care Physician or the Emergency Department.     NOTE TO GASTRIC BYPASS PATIENTS:  (SAME APPLIES TO GASTRIC SLEEVE PATIENTS FOR FIRST TWO MONTHS)  Remember that for the rest of your life, you are not able to take the following:  - NSAIDs (ibuprofen, goody powder, BC powder, Motrin, Advil, Mobic, Voltaren, Excedrin,

## 2024-05-29 NOTE — PROGRESS NOTES
Subjective:     Amanda Ricardo  is a 59 y.o. female who presents for follow-up about 10.5 years following laparoscopic sleeve gastrectomy. She has lost a total of 26 pounds since surgery.  Body mass index is 40.7 kg/m².. EBWL is (24%). The patient presents today to assess their progress toward their goal of weight loss and to address any issues that may be present.  Today the patient and I have reviewed their diet and how appropriate their food choices are.  The following issues have been identified - none from a surgical standpoint. Has maintained weight since last visit Sept 2022    Surgery related complication: NA       She reports occ reflux with certain foods and skin irritation under pannus and denies vomiting, abdominal pain, difficulty swallowing and nausea.    The patients diet choices have been reviewed today and counseling was given.      Fluid intake: fair      Protein intake: no supplements; nuts, PB, eggs, chicken, seafood      Meals/day: 3-4  Eating breads, sweets    Patients pain score:0/10    The patient's exercise level: exercising 60 minutes twice weekly including walking.    Changes in her medical history and medications have been reviewed.    Comorbidities:    Hypertension: resolved  Diabetes: not applicable  Obstructive Sleep Apnea: not applicable  Hyperlipidemia: not applicable  Stress Urinary Incontinence: not applicable  Gastroesophageal Reflux: improved, off PPI  Weight related arthropathy:not applicable    Patient Active Problem List   Diagnosis    Status post bariatric surgery    Intestinal malabsorption    Hypertension    Obesity, morbid ([facility])    GERD (gastroesophageal reflux disease)    Hypovitaminosis D    Menopause    Intertriginous dermatitis associated with moisture    S/P laparoscopic sleeve gastrectomy     Past Medical History:   Diagnosis Date    Hypertension     Hypovitaminosis D     Menopause     Morbid obesity ([facility]) 4/18/2013    Other ill-defined conditions(799.89)

## 2024-11-07 ENCOUNTER — CLINICAL DOCUMENTATION (OUTPATIENT)
Facility: HOSPITAL | Age: 59
End: 2024-11-07